# Patient Record
Sex: MALE | Race: ASIAN | NOT HISPANIC OR LATINO | URBAN - METROPOLITAN AREA
[De-identification: names, ages, dates, MRNs, and addresses within clinical notes are randomized per-mention and may not be internally consistent; named-entity substitution may affect disease eponyms.]

---

## 2020-11-24 ENCOUNTER — EMERGENCY (EMERGENCY)
Facility: HOSPITAL | Age: 25
LOS: 1 days | Discharge: HOME | End: 2020-11-24
Admitting: EMERGENCY MEDICINE
Payer: MEDICAID

## 2020-11-24 VITALS
RESPIRATION RATE: 18 BRPM | SYSTOLIC BLOOD PRESSURE: 125 MMHG | HEIGHT: 63 IN | OXYGEN SATURATION: 98 % | HEART RATE: 113 BPM | TEMPERATURE: 98 F | DIASTOLIC BLOOD PRESSURE: 72 MMHG | WEIGHT: 130.07 LBS

## 2020-11-24 DIAGNOSIS — R41.82 ALTERED MENTAL STATUS, UNSPECIFIED: ICD-10-CM

## 2020-11-24 DIAGNOSIS — F10.129 ALCOHOL ABUSE WITH INTOXICATION, UNSPECIFIED: ICD-10-CM

## 2020-11-24 PROCEDURE — 99284 EMERGENCY DEPT VISIT MOD MDM: CPT

## 2020-11-24 NOTE — ED PROVIDER NOTE - PATIENT PORTAL LINK FT
You can access the FollowMyHealth Patient Portal offered by [facility] by registering at the following website: [email or web address]. By joining [employer] [facility]’s FollowMyHealth portal, you will also be able to view your health information using other applications (apps) compatible with our system.

## 2020-11-24 NOTE — ED PROVIDER NOTE - CROS ED ROS STATEMENT
8/31/2018 Cardiac [email or web address]@ [email or web address]: Called . Venkat Ibrahim to discuss participation in the Cardiac Rehab Program. Patient did not answer phone and I was unable to leave a message. This was our last attempt to schedule the patient.     Corky Tony all other ROS negative except as per HPI

## 2020-11-24 NOTE — ED ADULT TRIAGE NOTE - CHIEF COMPLAINT QUOTE
Pt walks in w/ EMS for AMS. Pt admits to alcohol use, denies drug use. Pt has steady gait and clear speech.

## 2020-11-24 NOTE — ED ADULT NURSE NOTE - CHPI ED NUR SYMPTOMS NEG
no vomiting/no loss of consciousness/no weakness/no nausea/no blurred vision/no dizziness/no fever/no confusion

## 2020-11-24 NOTE — ED ADULT NURSE NOTE - SUICIDE SCREENING DEPRESSION
AMS and hyperglycemia.  IV, monitor, pulse ox, full labs, IV hydration, HCT, U tox, close monitoring. Negative

## 2020-11-24 NOTE — ED PROVIDER NOTE - OBJECTIVE STATEMENT
26 yo M with no known PMHx, BIBA for public intoxication. Admits to having heavy EtOH intake today.  Denies drug use or other illicits. No acute medical complaints or apparent trauma noted.  Denies trauma, fall, HA, dizziness, bleeding, N/V/D/C, CP, SOB, palpitations, tremors, change in urinary/bowel function, and abdominal pain. No illicit drug use noted.

## 2020-11-24 NOTE — ED ADULT NURSE NOTE - NSIMPLEMENTINTERV_GEN_ALL_ED
Implemented All Fall Risk Interventions:  [place]illi[place] to call system. Call bell, personal items and telephone within reach. Instruct patient to call for assistance. Room bathroom lighting operational. Non-slip footwear when patient is off stretcher. Physically safe environment: no spills, clutter or unnecessary equipment. Stretcher in lowest position, wheels locked, appropriate side rails in place. Provide visual cue, wrist band, yellow gown, etc. Monitor gait and stability. Monitor for mental status changes and reorient to person, place, and time. Review medications for side effects contributing to fall risk. Reinforce activity limits and safety measures with patient and family.

## 2020-11-24 NOTE — ED PROVIDER NOTE - PHYSICAL EXAMINATION
Gen - WDWN M, +AOB, no acute distress  Skin - warm, dry, intact  HEENT - AT/NC, PERRL, mild conjunctival injection, pupils 3mm b/l, TM intact with no hemotympanium b/l, no facial contusion or periorbital ecchymosis, o/p clear, uvula midline, airway patent, neck supple with no step off or midline tenderness, FROM   CV - S1S2, R/R/R  Resp - respiration non-labored, CTAB, symmetric bs b/l, no r/r/w  GI - NABS, soft, ND, NT, no rebound or guarding, no CVAT b/l  MS - w/w/p, no c/c/e, calves supple and NT  Neuro - Alert and awake, slightly slurred speech, ambulatory with unsteady gait, no focal deficits